# Patient Record
Sex: FEMALE | Race: WHITE | NOT HISPANIC OR LATINO | Employment: OTHER | URBAN - METROPOLITAN AREA
[De-identification: names, ages, dates, MRNs, and addresses within clinical notes are randomized per-mention and may not be internally consistent; named-entity substitution may affect disease eponyms.]

---

## 2024-05-13 ENCOUNTER — APPOINTMENT (OUTPATIENT)
Dept: DERMATOLOGY | Facility: CLINIC | Age: 89
End: 2024-05-13
Payer: COMMERCIAL

## 2024-06-11 ENCOUNTER — APPOINTMENT (OUTPATIENT)
Dept: DERMATOLOGY | Facility: CLINIC | Age: 89
End: 2024-06-11
Payer: COMMERCIAL

## 2024-07-05 ENCOUNTER — APPOINTMENT (OUTPATIENT)
Dept: DERMATOLOGY | Facility: CLINIC | Age: 89
End: 2024-07-05
Payer: COMMERCIAL

## 2024-08-20 ENCOUNTER — APPOINTMENT (OUTPATIENT)
Dept: DERMATOLOGY | Facility: CLINIC | Age: 89
End: 2024-08-20
Payer: COMMERCIAL

## 2024-08-20 DIAGNOSIS — L21.9 SEBORRHEIC DERMATITIS: ICD-10-CM

## 2024-08-20 DIAGNOSIS — R20.8 DYSESTHESIA OF MULTIPLE SITES: Primary | ICD-10-CM

## 2024-08-20 PROCEDURE — 1157F ADVNC CARE PLAN IN RCRD: CPT | Performed by: STUDENT IN AN ORGANIZED HEALTH CARE EDUCATION/TRAINING PROGRAM

## 2024-08-20 PROCEDURE — 99204 OFFICE O/P NEW MOD 45 MIN: CPT | Performed by: STUDENT IN AN ORGANIZED HEALTH CARE EDUCATION/TRAINING PROGRAM

## 2024-08-20 PROCEDURE — 1159F MED LIST DOCD IN RCRD: CPT | Performed by: STUDENT IN AN ORGANIZED HEALTH CARE EDUCATION/TRAINING PROGRAM

## 2024-08-20 RX ORDER — PRAMOXINE HYDROCHLORIDE 10 MG/ML
1 LOTION TOPICAL
Qty: 222 ML | Refills: 11 | Status: SHIPPED | OUTPATIENT
Start: 2024-08-20 | End: 2024-08-20

## 2024-08-20 RX ORDER — KETOCONAZOLE 20 MG/ML
SHAMPOO, SUSPENSION TOPICAL DAILY
Qty: 120 ML | Refills: 11 | Status: SHIPPED | OUTPATIENT
Start: 2024-08-20

## 2024-08-20 RX ORDER — FLUOCINONIDE TOPICAL SOLUTION USP, 0.05% 0.5 MG/ML
SOLUTION TOPICAL 2 TIMES DAILY
Qty: 60 ML | Refills: 11 | Status: SHIPPED | OUTPATIENT
Start: 2024-08-20 | End: 2024-08-20

## 2024-08-20 RX ORDER — PRAMOXINE HYDROCHLORIDE 10 MG/ML
1 LOTION TOPICAL
Qty: 222 ML | Refills: 11 | Status: SHIPPED | OUTPATIENT
Start: 2024-08-20

## 2024-08-20 RX ORDER — FLUOCINONIDE TOPICAL SOLUTION USP, 0.05% 0.5 MG/ML
SOLUTION TOPICAL 2 TIMES DAILY
Qty: 60 ML | Refills: 11 | Status: SHIPPED | OUTPATIENT
Start: 2024-08-20 | End: 2025-08-20

## 2024-08-20 RX ORDER — KETOCONAZOLE 20 MG/G
CREAM TOPICAL 2 TIMES DAILY
Qty: 30 G | Refills: 11 | Status: SHIPPED | OUTPATIENT
Start: 2024-08-20

## 2024-08-20 RX ORDER — KETOCONAZOLE 20 MG/ML
SHAMPOO, SUSPENSION TOPICAL DAILY
Qty: 120 ML | Refills: 11 | Status: SHIPPED | OUTPATIENT
Start: 2024-08-20 | End: 2024-08-20 | Stop reason: SDUPTHER

## 2024-08-20 RX ORDER — KETOCONAZOLE 20 MG/G
CREAM TOPICAL 2 TIMES DAILY
Qty: 30 G | Refills: 11 | Status: SHIPPED | OUTPATIENT
Start: 2024-08-20 | End: 2024-08-20 | Stop reason: SDUPTHER

## 2024-08-20 NOTE — PATIENT INSTRUCTIONS
You have a few things going on with your skin  There is evidence of seborrheic dermatitis of the scalp and ears. Start ketoconazole cream twice daily to ears or anywhere else on the face you feel scaling. Start ketoconazole shampoo to scalp when bathing, lather onto scalp and leave on ~5 minutes before rinsing. If your scalp is still itchy or bothersome you can use fluocinonide 0.05% solution twice daily as needed to the scalp.    You are experiencing a lot of symptoms on your skin due to nerve impairment and damage, which is why the skin findings don't always correlate with what you are feeling. I recommend using pramoxine 1% lotion multiple times daily to the skin (anywhere on the body) as needed for skin sensations.

## 2024-08-20 NOTE — PROGRESS NOTES
"Edgar Brunson is a 89 y.o. female who presents for the following: Rash (Patient has had  \"dry skin\" that has been falling from scalp and filling ears. She states this condition is all over her body. Patient feels that her her toes are being locked together and her symptoms are \"controlling her body\".  ).     Review of Systems:  No other skin or systemic complaints other than what is documented elsewhere in the note.    The following portions of the chart were reviewed this encounter and updated as appropriate:          Skin Cancer History  No skin cancer on file.      Specialty Problems    None       Objective   Well appearing patient in no apparent distress; mood and affect are within normal limits.    A focused skin examination was performed. All findings within normal limits unless otherwise noted below.    Assessment/Plan   1. Dysesthesia of multiple sites    Patient reports string like adhesions in crevices in ears or between fingers or keeping eyelids together  Reports being able to pull things apart and feeling scabs in her fingers  When she looks at her hands she reports the scabs/strings/adhesions disappear  Discussed that her symptoms are very likely due to nerve sensations which explains why she doesn't see the rash/skin changes that are causing her to have these feelings  Discussed use of pramoxine lotion multiple times daily    Related Medications  pramoxine (Sarna Sensitive) 1 % lotion  Apply 1 Application topically every 3 hours if needed (for itch). To anywhere on body    2. Seborrheic dermatitis  Erythema with overlying greasy scale on ears and scalp    Discussed the chronic and relapsing nature of the condition. Counseled on relation to normal yeast species on skin and body's immune reaction to it. Discussed that goal is control, not cure, of condition.     Start ketoconazole 2% cream BID to affected area on ears and face.  Start ketoconazole 2% shampoo daily to affected area on " scalp. Leave on 5 minutes before rinsing.   Lidex solution bid prn for itching to scalp      Related Medications  ketoconazole (NIZOral) 2 % cream  Apply topically 2 times a day. To scaly areas on ears and face    ketoconazole (NIZOral) 2 % shampoo  Apply topically once daily. Leave on 5 minutes before rinsing    fluocinonide (Lidex) 0.05 % external solution  Apply topically 2 times a day. As needed for itching on scalp

## 2024-09-06 ENCOUNTER — APPOINTMENT (OUTPATIENT)
Dept: DERMATOLOGY | Facility: CLINIC | Age: 89
End: 2024-09-06
Payer: COMMERCIAL